# Patient Record
Sex: FEMALE | Race: BLACK OR AFRICAN AMERICAN
[De-identification: names, ages, dates, MRNs, and addresses within clinical notes are randomized per-mention and may not be internally consistent; named-entity substitution may affect disease eponyms.]

---

## 2019-04-22 ENCOUNTER — HOSPITAL ENCOUNTER (EMERGENCY)
Dept: HOSPITAL 74 - JERFT | Age: 59
Discharge: HOME | End: 2019-04-22
Payer: COMMERCIAL

## 2019-04-22 VITALS — DIASTOLIC BLOOD PRESSURE: 76 MMHG | SYSTOLIC BLOOD PRESSURE: 157 MMHG | TEMPERATURE: 98.5 F | HEART RATE: 90 BPM

## 2019-04-22 VITALS — BODY MASS INDEX: 31.9 KG/M2

## 2019-04-22 DIAGNOSIS — L30.9: Primary | ICD-10-CM

## 2019-04-22 NOTE — PDOC
History of Present Illness





- General


Chief Complaint: Rash


Stated Complaint: BODY RASH


Time Seen by Provider: 04/22/19 10:27


History Source: Patient


Exam Limitations: No Limitations





- History of Present Illness


Initial Comments: 





04/22/19 10:54


Patient came to emergency department with complaints and concerns of a 

worsening irruption that is been lasting and spreading for over the past year. 

Patient has had multiple workups by multiple specialists including 

rheumatologist, dermatologist, and private physician with thorough laboratory 

work. States has had biopsy a few months ago which states did not have any 

definitive results. Is currently being treated for a seborrheic dermatitis with 

Vistaril and some topical creams but patient states lesions have spread and 

have become sensationally pruritic. Denies fever, ear throat pain, no other URI 

or clinical symptoms. No one else at home is affected


Timing/Duration: reports: getting worse


Severity: Yes: moderate


Location: reports: generalized (but sparing the neck and face.), torso


Respiratory Risk Factors: reports: no cause identified


Associated Symptoms: reports: denies symptoms, change in skin texture.  denies: 

flushing, headache





Past History





- Travel


Traveled outside of the country in the last 30 days: No


Close contact w/someone who was outside of country & ill: No





- Past Medical History


Allergies/Adverse Reactions: 


 Allergies











Allergy/AdvReac Type Severity Reaction Status Date / Time


 


Iodinated Contrast- Oral and Allergy   Verified 04/22/19 09:55





IV Dye     











Home Medications: 


Ambulatory Orders





Cetirizine HCl [Zyrtec -] 10 mg PO DAILY #30 tablet 04/22/19 











- Suicide/Smoking/Psychosocial Hx


Smoking History: Never smoked


Hx Alcohol Use: No


Drug/Substance Use Hx: No





**Review of Systems





- Review of Systems


Able to Perform ROS?: Yes


Is the patient limited English proficient: Yes


Constitutional: Yes: Symptoms Reported, See HPI


HEENTM: Yes: See HPI, Nose Congestion.  No: Symptoms Reported, Throat Pain


Respiratory: Yes: See HPI.  No: Symptoms reported, Cough


Musculoskeletal: Yes: Symptoms Reported


Integumentary: Yes: Symptoms Reported, See HPI, Lesions, Pruritus, Rash


All Other Systems: Reviewed and Negative





*Physical Exam





- Vital Signs


 Last Vital Signs











Temp Pulse Resp BP Pulse Ox


 


 98.5 F   90   16   157/76   95 


 


 04/22/19 09:55  04/22/19 09:55  04/22/19 09:55  04/22/19 09:55  04/22/19 09:55














- Physical Exam


General Appearance: Yes: Nourished, Appropriately Dressed, Apparent Distress, 

Mild Distress, Moderate Distress


HEENT: positive: KING, Normal ENT Inspection, Normal Voice, TMs Normal, Pharynx 

Normal


Neck: positive: Supple.  negative: Tender


Respiratory/Chest: positive: Lungs Clear


Gastrointestinal/Abdominal: positive: Soft.  negative: Tender


Integumentary: positive: Other (patient with extensive discrete pruritic type 

lesions where has lost skin pigment from unroofing. Is generalized and discrete 

without any specific type patterning covering all of body from clavicles down, 

sparing the face and the neck.)


Neurologic: positive: CNs II-XII NML intact, Fully Oriented, Alert, Normal Mood/

Affect, Normal Response, Motor Strength 5/5





Progress Note





- Progress Note


Progress Note: 





Severe dermatitis of unknown etiology. Patient is currently under the care of 

rheumatologist and dermatologist. Would only have recommendation to attempt 

bleach baths, keep skin warm moist and try to follow up with the specialist 

this week. Offered Zyrtec for antihistamine use daily





*DC/Admit/Observation/Transfer


Diagnosis at time of Disposition: 


 Acute dermatitis








- Discharge Dispostion


Disposition: HOME


Condition at time of disposition: Stable


Decision to Admit order: No





- Referrals


Referrals: 


Gideon Love MD [Primary Care Provider] - 





- Patient Instructions


Printed Discharge Instructions:  DI for Atopic Dermatitis - Adult


Additional Instructions: 


Rest, keep cool and dry- avoid strenuous activity or hot /humid environments


Less hot showers, no abrasive soaps


May use heavy creams like Eucerin or Cetaphil to keep skin moist ,  Vaseline 

currently recommended for better hydrating purpose





May use Benadryl at night for antihistamine, Zyrtec/ Allegra or Claritin for 

daytime antihistamine use to help with itching





Bleach baths, one half cup in a full tub of water creating a dilute solution 

and soaking for proximally 10 minutes. 


This chlorine is less strong than swimming pool if properly diluted. 


Will not discolor skin, avoid splashing in face or eyes.


This will decolonize/decontaminate skin from the bacteria that may be causing 

worsened eczema and itching. 


Some doctors recommend daily until severe episode is resolving then twice a 

week until healed





Followup with PMD in one week if no resolution


Make appointment with dermatologist for evaluation when possible





- Post Discharge Activity

## 2020-07-09 ENCOUNTER — HOSPITAL ENCOUNTER (OUTPATIENT)
Dept: HOSPITAL 74 - FMAMMOTONE | Age: 60
Discharge: HOME | End: 2020-07-09
Attending: INTERNAL MEDICINE
Payer: COMMERCIAL

## 2020-07-09 DIAGNOSIS — N60.92: Primary | ICD-10-CM

## 2020-07-09 DIAGNOSIS — R92.1: ICD-10-CM

## 2020-07-09 DIAGNOSIS — N64.89: ICD-10-CM

## 2020-07-09 PROCEDURE — 0HBU3ZX EXCISION OF LEFT BREAST, PERCUTANEOUS APPROACH, DIAGNOSTIC: ICD-10-PCS

## 2020-07-09 PROCEDURE — A4648 IMPLANTABLE TISSUE MARKER: HCPCS

## 2020-07-10 NOTE — PATH
Surgical Pathology Report



Patient Name:  SRIRAM LOBO

Accession #:  

Med. Rec. #:  [id number]                                                        

   /Age/Gender:  1960 (Age: 60) / F

Account:  [id number]                                                          

             [place] [facility] [place]  [place]

Received:  2020

Reported:  7/10/2020

Physicians:  GITA Elise M.D.

  



Specimen(s) Received

A: LEFT BREAST WITH CALCIFICATIONS 

B: LEFT BREAST WITHOUT CALCIFICATIONS 





Clinical History

Nonpalpable lesion

Mammographic findings: Microcalcification, suspicious







Final Diagnosis

A.. BREAST, LEFT, CALCIFICATIONS, STEREOTACTIC BIOPSY:

FOCAL ATYPICAL DUCTAL HYPERPLASIA (ADH) WITH ASSOCIATED CALCIFICATIONS.



B. BREAST, LEFT, WITHOUT CALCIFICATIONS, STEREOTACTIC BIOPSY:

BENIGN PREDOMINANTLY FATTY BREAST TISSUE.





***Electronically Signed***

Omayra Alcantar M.D.





Gross Description

A. Received in formalin labeled "left breast specimen with calcification," are 8

tan-yellow, cylindrical portions of fibroadipose tissue ranging from 0.4-1.7 cm

in length and averaging 0.2 cm in diameter. The specimens are submitted in toto

in one cassette.



B. Received in formalin labeled "left breast specimen without calcifications,"

is a 2.3 x 1.9 x 0.3 cm aggregate of multiple tan-yellow, irregular to

cylindrical portions of fibroadipose tissue. The formalin is filtered and the

specimen is entirely submitted in one cassette.

Time to formalin fixation: 4 minutes

Total formalin fixation: Approximately 7 hours

/2020/2020

## 2022-04-24 ENCOUNTER — HOSPITAL ENCOUNTER (INPATIENT)
Dept: HOSPITAL 74 - JER | Age: 62
LOS: 1 days | Discharge: ANOTHER HOSPITAL | DRG: 853 | End: 2022-04-25
Attending: INTERNAL MEDICINE | Admitting: INTERNAL MEDICINE
Payer: COMMERCIAL

## 2022-04-24 VITALS — BODY MASS INDEX: 35.2 KG/M2

## 2022-04-24 DIAGNOSIS — K65.9: ICD-10-CM

## 2022-04-24 DIAGNOSIS — E87.2: ICD-10-CM

## 2022-04-24 DIAGNOSIS — J96.00: ICD-10-CM

## 2022-04-24 DIAGNOSIS — N17.9: ICD-10-CM

## 2022-04-24 DIAGNOSIS — I27.20: ICD-10-CM

## 2022-04-24 DIAGNOSIS — J18.9: ICD-10-CM

## 2022-04-24 DIAGNOSIS — I31.3: ICD-10-CM

## 2022-04-24 DIAGNOSIS — R65.21: ICD-10-CM

## 2022-04-24 DIAGNOSIS — J90: ICD-10-CM

## 2022-04-24 DIAGNOSIS — A41.9: Primary | ICD-10-CM

## 2022-04-24 DIAGNOSIS — K57.80: ICD-10-CM

## 2022-04-24 DIAGNOSIS — R18.8: ICD-10-CM

## 2022-04-24 LAB
ALBUMIN SERPL-MCNC: 1.8 G/DL (ref 3.4–5)
ALBUMIN SERPL-MCNC: 2.1 G/DL (ref 3.4–5)
ALP SERPL-CCNC: 80 U/L (ref 45–117)
ALP SERPL-CCNC: 92 U/L (ref 45–117)
ALT SERPL-CCNC: 17 U/L (ref 13–61)
ALT SERPL-CCNC: 19 U/L (ref 13–61)
ANION GAP SERPL CALC-SCNC: 13 MMOL/L (ref 8–16)
ANION GAP SERPL CALC-SCNC: 13 MMOL/L (ref 8–16)
ANISOCYTOSIS BLD QL: (no result)
APPEARANCE UR: (no result)
APTT BLD: 23.7 SECONDS (ref 25.2–36.5)
AST SERPL-CCNC: 40 U/L (ref 15–37)
AST SERPL-CCNC: 44 U/L (ref 15–37)
BACTERIA # UR AUTO: 2580 /UL (ref 0–1359)
BASE EXCESS BLDV CALC-SCNC: -8.2 MMOL/L (ref -2–2)
BILIRUB SERPL-MCNC: 1.5 MG/DL (ref 0.2–1)
BILIRUB SERPL-MCNC: 1.5 MG/DL (ref 0.2–1)
BILIRUB UR STRIP.AUTO-MCNC: (no result) MG/DL
BUN SERPL-MCNC: 51.2 MG/DL (ref 7–18)
BUN SERPL-MCNC: 56.4 MG/DL (ref 7–18)
CALCIUM SERPL-MCNC: 7.5 MG/DL (ref 8.5–10.1)
CALCIUM SERPL-MCNC: 8.1 MG/DL (ref 8.5–10.1)
CASTS URNS QL MICRO: 13 /UL (ref 0–3.1)
CHLORIDE SERPL-SCNC: 103 MMOL/L (ref 98–107)
CHLORIDE SERPL-SCNC: 105 MMOL/L (ref 98–107)
CO2 SERPL-SCNC: 17 MMOL/L (ref 21–32)
CO2 SERPL-SCNC: 21 MMOL/L (ref 21–32)
COLOR UR: (no result)
CREAT SERPL-MCNC: 3 MG/DL (ref 0.55–1.3)
CREAT SERPL-MCNC: 3.1 MG/DL (ref 0.55–1.3)
DEPRECATED RDW RBC AUTO: 15.9 % (ref 11.6–15.6)
DEPRECATED RDW RBC AUTO: 16.2 % (ref 11.6–15.6)
EPITH CASTS URNS QL MICRO: >36 /UL (ref 0–25.1)
GLUCOSE SERPL-MCNC: 89 MG/DL (ref 74–106)
GLUCOSE SERPL-MCNC: 93 MG/DL (ref 74–106)
HCT VFR BLD CALC: 26.7 % (ref 32.4–45.2)
HCT VFR BLD CALC: 29.8 % (ref 32.4–45.2)
HCT VFR BLDV CALC: 31 % (ref 32.4–45.2)
HGB BLD-MCNC: 8.6 GM/DL (ref 10.7–15.3)
HGB BLD-MCNC: 9.3 GM/DL (ref 10.7–15.3)
INR BLD: 1.25 (ref 0.83–1.09)
KETONES UR QL STRIP: (no result)
LEUKOCYTE ESTERASE UR QL STRIP.AUTO: (no result)
MACROCYTES BLD QL: (no result)
MAGNESIUM SERPL-MCNC: 1.8 MG/DL (ref 1.8–2.4)
MCH RBC QN AUTO: 26.6 PG (ref 25.7–33.7)
MCH RBC QN AUTO: 27.2 PG (ref 25.7–33.7)
MCHC RBC AUTO-ENTMCNC: 31.2 G/DL (ref 32–36)
MCHC RBC AUTO-ENTMCNC: 32.2 G/DL (ref 32–36)
MCV RBC: 84.5 FL (ref 80–96)
MCV RBC: 85.5 FL (ref 80–96)
NITRITE UR QL STRIP: POSITIVE
PCO2 BLDV: 42 MMHG (ref 38–52)
PH BLDV: 7.26 [PH] (ref 7.31–7.41)
PH UR: 5 [PH] (ref 5–8)
PHOSPHATE SERPL-MCNC: 4.3 MG/DL (ref 2.5–4.9)
PLATELET # BLD AUTO: 353 10^3/UL (ref 134–434)
PLATELET # BLD AUTO: 416 10^3/UL (ref 134–434)
PLATELET BLD QL SMEAR: NORMAL
PMV BLD: 7.8 FL (ref 7.5–11.1)
PMV BLD: 8 FL (ref 7.5–11.1)
PROT SERPL-MCNC: 6.6 G/DL (ref 6.4–8.2)
PROT SERPL-MCNC: 7.7 G/DL (ref 6.4–8.2)
PROT UR QL STRIP: (no result)
PROT UR QL STRIP: NEGATIVE
PT PNL PPP: 14.4 SEC (ref 9.7–13)
RBC # BLD AUTO: 175.1 /UL (ref 0–23.9)
RBC # BLD AUTO: 3.16 M/MM3 (ref 3.6–5.2)
RBC # BLD AUTO: 3.48 M/MM3 (ref 3.6–5.2)
SAO2 % BLDV: 64.5 % (ref 70–80)
SODIUM SERPL-SCNC: 135 MMOL/L (ref 136–145)
SODIUM SERPL-SCNC: 136 MMOL/L (ref 136–145)
SP GR UR: 1.02 (ref 1.01–1.03)
TARGETS BLD QL SMEAR: (no result)
UROBILINOGEN UR STRIP-MCNC: 1 MG/DL (ref 0.2–1)
WBC # BLD AUTO: 28.1 K/MM3 (ref 4–10)
WBC # BLD AUTO: 32 K/MM3 (ref 4–10)
WBC # UR AUTO: 34 /UL (ref 0–25.8)
YEAST BUDDING URNS QL: NEGATIVE

## 2022-04-24 PROCEDURE — U0003 INFECTIOUS AGENT DETECTION BY NUCLEIC ACID (DNA OR RNA); SEVERE ACUTE RESPIRATORY SYNDROME CORONAVIRUS 2 (SARS-COV-2) (CORONAVIRUS DISEASE [COVID-19]), AMPLIFIED PROBE TECHNIQUE, MAKING USE OF HIGH THROUGHPUT TECHNOLOGIES AS DESCRIBED BY CMS-2020-01-R: HCPCS

## 2022-04-24 PROCEDURE — G0480 DRUG TEST DEF 1-7 CLASSES: HCPCS

## 2022-04-24 PROCEDURE — U0005 INFEC AGEN DETEC AMPLI PROBE: HCPCS

## 2022-04-24 PROCEDURE — B543ZZA ULTRASONOGRAPHY OF RIGHT JUGULAR VEINS, GUIDANCE: ICD-10-PCS | Performed by: INTERNAL MEDICINE

## 2022-04-24 PROCEDURE — 05HM33Z INSERTION OF INFUSION DEVICE INTO RIGHT INTERNAL JUGULAR VEIN, PERCUTANEOUS APPROACH: ICD-10-PCS | Performed by: INTERNAL MEDICINE

## 2022-04-24 RX ADMIN — MUPIROCIN SCH APPLIC: 20 OINTMENT TOPICAL at 22:30

## 2022-04-24 RX ADMIN — PIPERACILLIN SODIUM AND TAZOBACTAM SODIUM SCH MLS/HR: .25; 2 INJECTION, POWDER, LYOPHILIZED, FOR SOLUTION INTRAVENOUS at 20:32

## 2022-04-24 RX ADMIN — NOREPINEPHRINE BITARTRATE SCH MLS/HR: 1 INJECTION, SOLUTION, CONCENTRATE INTRAVENOUS at 17:08

## 2022-04-25 VITALS — SYSTOLIC BLOOD PRESSURE: 92 MMHG | HEART RATE: 100 BPM | DIASTOLIC BLOOD PRESSURE: 58 MMHG

## 2022-04-25 VITALS — TEMPERATURE: 99.5 F

## 2022-04-25 LAB
ALBUMIN SERPL-MCNC: 1.4 G/DL (ref 3.4–5)
ALP SERPL-CCNC: 76 U/L (ref 45–117)
ALT SERPL-CCNC: 23 U/L (ref 13–61)
ANION GAP SERPL CALC-SCNC: 11 MMOL/L (ref 8–16)
ANION GAP SERPL CALC-SCNC: 11 MMOL/L (ref 8–16)
ANISOCYTOSIS BLD QL: 0
ANISOCYTOSIS BLD QL: 0
APPEARANCE UR: (no result)
APTT BLD: 31.6 SECONDS (ref 25.2–36.5)
ARTERIAL PATENCY WRIST A: POSITIVE
AST SERPL-CCNC: 52 U/L (ref 15–37)
BACTERIA # UR AUTO: 37.7 /UL (ref 0–1359)
BASE EXCESS BLDA CALC-SCNC: -12.8 MMOL/L (ref -2–2)
BASE EXCESS BLDA CALC-SCNC: -13.9 MMOL/L (ref -2–2)
BASE EXCESS BLDA CALC-SCNC: -15.2 MMOL/L (ref -2–2)
BASO STIPL BLD QL SMEAR: 0
BILIRUB SERPL-MCNC: 1.6 MG/DL (ref 0.2–1)
BILIRUB UR STRIP.AUTO-MCNC: (no result) MG/DL
BREATHS.MECHANICAL ON VENT: 16
BREATHS.MECHANICAL ON VENT: 18
BREATHS.MECHANICAL ON VENT: 24
BUN SERPL-MCNC: 49 MG/DL (ref 7–18)
BUN SERPL-MCNC: 51.9 MG/DL (ref 7–18)
CALCIUM SERPL-MCNC: 6.7 MG/DL (ref 8.5–10.1)
CALCIUM SERPL-MCNC: 7 MG/DL (ref 8.5–10.1)
CASTS URNS QL MICRO: 7 /UL (ref 0–3.1)
CHLORIDE SERPL-SCNC: 108 MMOL/L (ref 98–107)
CHLORIDE SERPL-SCNC: 109 MMOL/L (ref 98–107)
CO2 SERPL-SCNC: 17 MMOL/L (ref 21–32)
CO2 SERPL-SCNC: 18 MMOL/L (ref 21–32)
COLOR UR: (no result)
CREAT SERPL-MCNC: 2.7 MG/DL (ref 0.55–1.3)
CREAT SERPL-MCNC: 2.7 MG/DL (ref 0.55–1.3)
DACRYOCYTES BLD QL SMEAR: (no result)
DACRYOCYTES BLD QL SMEAR: 0
DEPRECATED RDW RBC AUTO: 16.2 % (ref 11.6–15.6)
DEPRECATED RDW RBC AUTO: 17.1 % (ref 11.6–15.6)
DOHLE BOD BLD QL SMEAR: 0
EPITH CASTS URNS QL MICRO: 26 /UL (ref 0–25.1)
GLUCOSE SERPL-MCNC: 116 MG/DL (ref 74–106)
GLUCOSE SERPL-MCNC: 90 MG/DL (ref 74–106)
HCT VFR BLD CALC: 29.3 % (ref 32.4–45.2)
HCT VFR BLD CALC: 30.2 % (ref 32.4–45.2)
HCT VFR BLDV CALC: 27 % (ref 32.4–45.2)
HCT VFR BLDV CALC: 28 % (ref 32.4–45.2)
HCT VFR BLDV CALC: 29 % (ref 32.4–45.2)
HELMET CELLS BLD QL SMEAR: 0
HGB BLD-MCNC: 9.3 GM/DL (ref 10.7–15.3)
HGB BLD-MCNC: 9.7 GM/DL (ref 10.7–15.3)
HOWELL-JOLLY BOD BLD QL SMEAR: 0
INR BLD: 1.33 (ref 0.83–1.09)
KETONES UR QL STRIP: (no result)
LACTATE SERPL-MCNC: 2.1 MMOL/L (ref 0.4–2)
LEUKOCYTE ESTERASE UR QL STRIP.AUTO: NEGATIVE
MACROCYTES BLD QL: 0
MACROCYTES BLD QL: 0
MAGNESIUM SERPL-MCNC: 1.4 MG/DL (ref 1.8–2.4)
MCH RBC QN AUTO: 27.1 PG (ref 25.7–33.7)
MCH RBC QN AUTO: 27.3 PG (ref 25.7–33.7)
MCHC RBC AUTO-ENTMCNC: 31.9 G/DL (ref 32–36)
MCHC RBC AUTO-ENTMCNC: 32 G/DL (ref 32–36)
MCV RBC: 84.9 FL (ref 80–96)
MCV RBC: 85.7 FL (ref 80–96)
NITRITE UR QL STRIP: NEGATIVE
OVALOCYTES BLD QL SMEAR: (no result)
OVALOCYTES BLD QL SMEAR: 0
PCO2 BLDA: 24.7 MMHG (ref 35–45)
PCO2 BLDA: 33.9 MMHG (ref 35–45)
PCO2 BLDA: 35.5 MMHG (ref 35–45)
PH UR: 5 [PH] (ref 5–8)
PHOSPHATE SERPL-MCNC: 5.8 MG/DL (ref 2.5–4.9)
PLATELET # BLD AUTO: 403 10^3/UL (ref 134–434)
PLATELET # BLD AUTO: 417 10^3/UL (ref 134–434)
PMV BLD: 8 FL (ref 7.5–11.1)
PMV BLD: 8.2 FL (ref 7.5–11.1)
PO2 BLDA: 132.9 MMHG (ref 80–100)
PO2 BLDA: 177.1 MMHG (ref 80–100)
PO2 BLDA: 99.3 MMHG (ref 80–100)
PROT SERPL-MCNC: 5.5 G/DL (ref 6.4–8.2)
PROT UR QL STRIP: (no result)
PROT UR QL STRIP: NEGATIVE
PT PNL PPP: 15.3 SEC (ref 9.7–13)
RBC # BLD AUTO: 17 /UL (ref 0–23.9)
RBC # BLD AUTO: 3.42 M/MM3 (ref 3.6–5.2)
RBC # BLD AUTO: 3.56 M/MM3 (ref 3.6–5.2)
ROULEAUX BLD QL SMEAR: 0
SAO2 % BLDA: 95.9 % (ref 95–98)
SAO2 % BLDA: 98.5 % (ref 95–98)
SAO2 % BLDA: 98.9 % (ref 95–98)
SICKLE CELLS BLD QL SMEAR: 0
SODIUM SERPL-SCNC: 136 MMOL/L (ref 136–145)
SODIUM SERPL-SCNC: 137 MMOL/L (ref 136–145)
SP GR UR: 1.03 (ref 1.01–1.03)
TARGETS BLD QL SMEAR: 0
TOXIC GRANULES BLD QL SMEAR: 0
UROBILINOGEN UR STRIP-MCNC: 1 MG/DL (ref 0.2–1)
VENTILATION MODE VENT: (no result)
WBC # BLD AUTO: 16.5 K/MM3 (ref 4–10)
WBC # BLD AUTO: 19.7 K/MM3 (ref 4–10)
WBC # UR AUTO: 75.6 /UL (ref 0–25.8)

## 2022-04-25 PROCEDURE — 0DTG0ZZ RESECTION OF LEFT LARGE INTESTINE, OPEN APPROACH: ICD-10-PCS | Performed by: SURGERY

## 2022-04-25 PROCEDURE — 0D1M0Z4 BYPASS DESCENDING COLON TO CUTANEOUS, OPEN APPROACH: ICD-10-PCS | Performed by: SURGERY

## 2022-04-25 PROCEDURE — 5A1935Z RESPIRATORY VENTILATION, LESS THAN 24 CONSECUTIVE HOURS: ICD-10-PCS | Performed by: INTERNAL MEDICINE

## 2022-04-25 RX ADMIN — NOREPINEPHRINE BITARTRATE SCH MLS/HR: 1 INJECTION, SOLUTION, CONCENTRATE INTRAVENOUS at 18:15

## 2022-04-25 RX ADMIN — Medication SCH MLS/HR: at 01:02

## 2022-04-25 RX ADMIN — FENTANYL CITRATE SCH MLS/HR: 0.05 INJECTION, SOLUTION INTRAMUSCULAR; INTRAVENOUS at 09:49

## 2022-04-25 RX ADMIN — PIPERACILLIN SODIUM AND TAZOBACTAM SODIUM SCH MLS/HR: .25; 2 INJECTION, POWDER, LYOPHILIZED, FOR SOLUTION INTRAVENOUS at 09:52

## 2022-04-25 RX ADMIN — VASOPRESSIN SCH MLS/HR: 20 INJECTION INTRAVENOUS at 11:54

## 2022-04-25 RX ADMIN — Medication SCH MLS/HR: at 18:15

## 2022-04-25 RX ADMIN — VASOPRESSIN SCH MLS/HR: 20 INJECTION INTRAVENOUS at 18:15

## 2022-04-25 RX ADMIN — MUPIROCIN SCH APPLIC: 20 OINTMENT TOPICAL at 10:02

## 2022-04-25 RX ADMIN — FENTANYL CITRATE SCH MLS/HR: 0.05 INJECTION, SOLUTION INTRAMUSCULAR; INTRAVENOUS at 18:15

## 2022-04-25 RX ADMIN — PIPERACILLIN SODIUM AND TAZOBACTAM SODIUM SCH MLS/HR: .25; 2 INJECTION, POWDER, LYOPHILIZED, FOR SOLUTION INTRAVENOUS at 02:11

## 2022-04-25 RX ADMIN — NOREPINEPHRINE BITARTRATE SCH MLS/HR: 1 INJECTION, SOLUTION, CONCENTRATE INTRAVENOUS at 11:55

## 2022-04-25 RX ADMIN — FENTANYL CITRATE SCH MLS/HR: 0.05 INJECTION, SOLUTION INTRAMUSCULAR; INTRAVENOUS at 01:02
